# Patient Record
Sex: FEMALE | Race: BLACK OR AFRICAN AMERICAN | Employment: FULL TIME | URBAN - METROPOLITAN AREA
[De-identification: names, ages, dates, MRNs, and addresses within clinical notes are randomized per-mention and may not be internally consistent; named-entity substitution may affect disease eponyms.]

---

## 2022-12-07 ENCOUNTER — HOSPITAL ENCOUNTER (EMERGENCY)
Dept: GENERAL RADIOLOGY | Age: 32
Discharge: HOME | End: 2022-12-10

## 2022-12-07 ENCOUNTER — HOSPITAL ENCOUNTER (EMERGENCY)
Age: 32
Discharge: HOME | End: 2022-12-07
Attending: EMERGENCY MEDICINE

## 2022-12-07 VITALS
WEIGHT: 170 LBS | HEIGHT: 63 IN | DIASTOLIC BLOOD PRESSURE: 80 MMHG | RESPIRATION RATE: 18 BRPM | OXYGEN SATURATION: 100 % | SYSTOLIC BLOOD PRESSURE: 131 MMHG | HEART RATE: 68 BPM | TEMPERATURE: 98.5 F | BODY MASS INDEX: 30.12 KG/M2

## 2022-12-07 DIAGNOSIS — B97.89 VIRAL RESPIRATORY INFECTION: Primary | ICD-10-CM

## 2022-12-07 DIAGNOSIS — J98.8 VIRAL RESPIRATORY INFECTION: Primary | ICD-10-CM

## 2022-12-07 DIAGNOSIS — R06.09 DOE (DYSPNEA ON EXERTION): ICD-10-CM

## 2022-12-07 LAB
FLUAV AG NPH QL IA: NEGATIVE
FLUBV AG NPH QL IA: NEGATIVE
SARS-COV-2 RDRP RESP QL NAA+PROBE: NOT DETECTED
SOURCE: NORMAL
SPECIMEN SOURCE: NORMAL

## 2022-12-07 PROCEDURE — 99284 EMERGENCY DEPT VISIT MOD MDM: CPT

## 2022-12-07 PROCEDURE — 87635 SARS-COV-2 COVID-19 AMP PRB: CPT

## 2022-12-07 PROCEDURE — 71046 X-RAY EXAM CHEST 2 VIEWS: CPT

## 2022-12-07 PROCEDURE — 87804 INFLUENZA ASSAY W/OPTIC: CPT

## 2022-12-07 RX ORDER — ALBUTEROL SULFATE 90 UG/1
2 AEROSOL, METERED RESPIRATORY (INHALATION) 4 TIMES DAILY PRN
Qty: 1 EACH | Refills: 0 | Status: SHIPPED | OUTPATIENT
Start: 2022-12-07

## 2022-12-07 RX ORDER — AZITHROMYCIN 250 MG/1
250 TABLET, FILM COATED ORAL SEE ADMIN INSTRUCTIONS
Qty: 6 TABLET | Refills: 0 | Status: SHIPPED | OUTPATIENT
Start: 2022-12-07 | End: 2022-12-12

## 2022-12-07 RX ORDER — PREDNISONE 20 MG/1
40 TABLET ORAL DAILY
Qty: 10 TABLET | Refills: 0 | Status: SHIPPED | OUTPATIENT
Start: 2022-12-07 | End: 2022-12-12

## 2022-12-07 NOTE — DISCHARGE INSTRUCTIONS
It is suspected you have some form of viral illness contributing or causing your shortness of breath. Please monitor your symptoms closely. Please return for any questions, concerns or worsening symptoms, particularly increasing shortness of breath, persistent wheezing, increasing chest pain, passing out episodes.

## 2022-12-07 NOTE — Clinical Note
Dilip Amaral was seen and treated in our emergency department on 12/7/2022. She may return to work on 12/10/2022. If you have any questions or concerns, please don't hesitate to call.       Frances Paniagua MD

## 2022-12-07 NOTE — ED TRIAGE NOTES
Pt c/o sore throat, chest cingestion, cough, cold sweats at home, loss of appetite, denies n&v, denies diarrhea, denies nasal congestion.

## 2022-12-07 NOTE — ED PROVIDER NOTES
Emergency Department Provider Note                   PCP:                None Provider               Age: 28 y.o. Sex: female     Final diagnosis/impression:  1. Viral respiratory infection    2. MOORE (dyspnea on exertion)         Disposition: Discharge    MDM/Clinical Course:  Patient seen by myself here in the [address] [place] [address] emergency department. Patient had signs, symptoms and clinical history most consistent with dyspnea on exertion symptoms, overall well-appearing, chest x-ray, flu/COVID testing unremarkable. Her lungs sound clear on my examination. Ambulatory O2 testing performed, with patient satting 100% here in the emergency department. Vital signs not suggestive of PE and does not have other risk factors apart from smoking. Do not feel this is cardiac related, low risk overall with patient being 60-year-old female, no family history of sudden cardiac death or early cardiac disease. Discussed possibility of D-dimer testing but at this time we will treat symptomatically with albuterol inhalers, steroid pack, Z-Michael. Recommended close monitoring of symptoms, work note written. Patient/family given instructions to return as needed for any questions, concerns or worsening symptoms, particularly those as outlined in the disposition section / discharge section of patient discharge paperwork. Patient/family verbalizes understanding agreement with ED course/plan. Questions answered. Did discuss with patient that medications given during visit / discharge prescriptions would not nessarily be those selected if patient were pregnant and such medications could potentially cause harm to a developing fetus or pregnancy. Patient is declining pregnancy testing during visit which is reasonable and within her choice as patient here in the emergency department. Patient does not feel she could be pregnant.     HPI: Cinda Raymond is a 28 y.o. female with past medical history of childhood asthma presenting for evaluation of dyspnea symptoms. Patient notes symptoms starting Sunday that include increased dyspnea on exertion, central chest tightness that is nonradiating. She notes very mild cough but generally states her cough has been appropriate with Robitussin. Patient denies unilateral or bilateral or lower extremity edema or pain symptoms. Patient is a smoker, has an IUD, does not take oral contraceptives, no personal history of DVT/PE. Has had bouts of bronchitis in the past.  No history of sudden cardiac death. No palpitations. Patient/family denies any other evaluation for today's acute complaint. Patient/family denies any other aggravating or alleviating factors. Patient/family denies any other symptoms. ROS:   All review of systems negative except as noted above in the history of the present illness. Past Medical/ Family/ Social History:     Medical history: History reviewed. No pertinent past medical history. Remote history of childhood asthma  Surgical history: History reviewed. No pertinent surgical history. Denies pertinent surgical history  Family history: History reviewed. No pertinent family history. No family history of sudden cardiac death  Social history:   Social History     Socioeconomic History    Marital status: Single     Spouse name: None    Number of children: None    Years of education: None    Highest education level: None        Medications:   Previous Medications    No medications on file        Allergies: No Known Allergies      Physical Exam     Vitals signs reviewed.    Patient Vitals for the past 4 hrs:   Temp Pulse Resp BP SpO2   12/07/22 0800 -- 65 18 -- 100 %   12/07/22 0639 98.5 °F (36.9 °C) 69 16 134/87 100 %       General: Alert and oriented ×4, no acute distress   Eyes: Anicteric, conjunctiva pink, PERRLA, EOMI  ENT: No nasal discharge, no gross nasal congestion present  Pulmonary: Clear to auscultation bilaterally with symmetric chest rise, no increased work of breathing, no accessory muscle use  Cardiovascular: Regular rate and rhythm, no rub or gallop appreciated on my exam  GI: Abdomen is soft, nontender, nondistended, bowel sounds are present  Musculoskeletal: No obvious joint deformity or joint effusion, normal joint range of motion  Neuro: Cranial nerves II through VII grossly intact, strength and sensation is grossly intact in the upper and lower extremities bilaterally  Skin: Skin is warm and dry, no rash or lesions    Procedures        Results for orders placed or performed during the hospital encounter of 12/07/22   Rapid influenza A/B antigens    Specimen: Nasal Washing   Result Value Ref Range    Influenza A Ag Negative NEG      Influenza B Ag Negative NEG      Source Nasopharyngeal     COVID-19, Rapid    Specimen: Nasopharyngeal   Result Value Ref Range    Source Nasopharyngeal      SARS-CoV-2, Rapid Not detected NOTD     XR CHEST (2 VW)    Narrative    EXAM: XR CHEST (2 VW)    HISTORY: chest congestion. TECHNIQUE: Frontal and lateral chest.    COMPARISON: None available. FINDINGS:   The cardiac silhouette, mediastinum, and pulmonary vasculature are within normal  limits. There is no consolidation, pleural effusion, or pneumothorax. No significant osseous abnormalities are observed. Impression    No evidence of an acute intrathoracic process. XR CHEST (2 VW)   Final Result   No evidence of an acute intrathoracic process. Voice dictation software was used during the making of this note. This software is not perfect and grammatical and other typographical errors may be present. This note has not been completely proofread for errors.      Lexie Atkinson MD  12/07/22 [id number]

## 2022-12-07 NOTE — ED NOTES
I have reviewed discharge instructions with the patient. The patient verbalized understanding. Patient left ED via Discharge Method: ambulatory to Home with (self). Opportunity for questions and clarification provided. Patient given 3 scripts. To continue your aftercare when you leave the hospital, you may receive an automated call from our care team to check in on how you are doing. This is a free service and part of our promise to provide the best care and service to meet your aftercare needs.  If you have questions, or wish to unsubscribe from this service please call [phone]. Thank you for Choosing our [facility] Emergency Department.        Christy Viveros RN  12/07/22 3646